# Patient Record
Sex: MALE | Race: WHITE
[De-identification: names, ages, dates, MRNs, and addresses within clinical notes are randomized per-mention and may not be internally consistent; named-entity substitution may affect disease eponyms.]

---

## 2019-11-05 ENCOUNTER — HOSPITAL ENCOUNTER (EMERGENCY)
Dept: HOSPITAL 62 - ER | Age: 39
Discharge: HOME | End: 2019-11-05
Payer: SELF-PAY

## 2019-11-05 VITALS — DIASTOLIC BLOOD PRESSURE: 61 MMHG | SYSTOLIC BLOOD PRESSURE: 100 MMHG

## 2019-11-05 DIAGNOSIS — M25.551: ICD-10-CM

## 2019-11-05 DIAGNOSIS — M25.511: Primary | ICD-10-CM

## 2019-11-05 DIAGNOSIS — M54.6: ICD-10-CM

## 2019-11-05 DIAGNOSIS — M54.9: ICD-10-CM

## 2019-11-05 DIAGNOSIS — F17.200: ICD-10-CM
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
## 2019-11-05 LAB
ADD MANUAL DIFF: NO
ALBUMIN SERPL-MCNC: 4.7 G/DL (ref 3.5–5)
ALP SERPL-CCNC: 77 U/L (ref 38–126)
ANION GAP SERPL CALC-SCNC: 11 MMOL/L (ref 5–19)
APPEARANCE UR: CLEAR
APTT PPP: YELLOW S
AST SERPL-CCNC: 31 U/L (ref 17–59)
BASOPHILS # BLD AUTO: 0.1 10^3/UL (ref 0–0.2)
BASOPHILS NFR BLD AUTO: 1.5 % (ref 0–2)
BILIRUB DIRECT SERPL-MCNC: 0.2 MG/DL (ref 0–0.4)
BILIRUB SERPL-MCNC: 0.4 MG/DL (ref 0.2–1.3)
BILIRUB UR QL STRIP: NEGATIVE
BUN SERPL-MCNC: 16 MG/DL (ref 7–20)
CALCIUM: 9.9 MG/DL (ref 8.4–10.2)
CHLORIDE SERPL-SCNC: 103 MMOL/L (ref 98–107)
CO2 SERPL-SCNC: 27 MMOL/L (ref 22–30)
EOSINOPHIL # BLD AUTO: 0.3 10^3/UL (ref 0–0.6)
EOSINOPHIL NFR BLD AUTO: 3 % (ref 0–6)
ERYTHROCYTE [DISTWIDTH] IN BLOOD BY AUTOMATED COUNT: 13.1 % (ref 11.5–14)
GLUCOSE SERPL-MCNC: 87 MG/DL (ref 75–110)
GLUCOSE UR STRIP-MCNC: NEGATIVE MG/DL
HCT VFR BLD CALC: 41.8 % (ref 37.9–51)
HGB BLD-MCNC: 15.1 G/DL (ref 13.5–17)
KETONES UR STRIP-MCNC: NEGATIVE MG/DL
LYMPHOCYTES # BLD AUTO: 3.4 10^3/UL (ref 0.5–4.7)
LYMPHOCYTES NFR BLD AUTO: 37.2 % (ref 13–45)
MCH RBC QN AUTO: 31.8 PG (ref 27–33.4)
MCHC RBC AUTO-ENTMCNC: 36.1 G/DL (ref 32–36)
MCV RBC AUTO: 88 FL (ref 80–97)
MONOCYTES # BLD AUTO: 0.9 10^3/UL (ref 0.1–1.4)
MONOCYTES NFR BLD AUTO: 9.9 % (ref 3–13)
NEUTROPHILS # BLD AUTO: 4.4 10^3/UL (ref 1.7–8.2)
NEUTS SEG NFR BLD AUTO: 48.4 % (ref 42–78)
PH UR STRIP: 6 [PH] (ref 5–9)
PLATELET # BLD: 255 10^3/UL (ref 150–450)
POTASSIUM SERPL-SCNC: 4.3 MMOL/L (ref 3.6–5)
PROT SERPL-MCNC: 7.9 G/DL (ref 6.3–8.2)
PROT UR STRIP-MCNC: NEGATIVE MG/DL
RBC # BLD AUTO: 4.74 10^6/UL (ref 4.35–5.55)
SP GR UR STRIP: 1.02
TOTAL CELLS COUNTED % (AUTO): 100 %
UROBILINOGEN UR-MCNC: NEGATIVE MG/DL (ref ?–2)
WBC # BLD AUTO: 9.2 10^3/UL (ref 4–10.5)

## 2019-11-05 PROCEDURE — 73502 X-RAY EXAM HIP UNI 2-3 VIEWS: CPT

## 2019-11-05 PROCEDURE — 80053 COMPREHEN METABOLIC PANEL: CPT

## 2019-11-05 PROCEDURE — 73030 X-RAY EXAM OF SHOULDER: CPT

## 2019-11-05 PROCEDURE — 81001 URINALYSIS AUTO W/SCOPE: CPT

## 2019-11-05 PROCEDURE — 36415 COLL VENOUS BLD VENIPUNCTURE: CPT

## 2019-11-05 PROCEDURE — 85025 COMPLETE CBC W/AUTO DIFF WBC: CPT

## 2019-11-05 PROCEDURE — 76770 US EXAM ABDO BACK WALL COMP: CPT

## 2019-11-05 PROCEDURE — 99284 EMERGENCY DEPT VISIT MOD MDM: CPT

## 2019-11-05 PROCEDURE — 96374 THER/PROPH/DIAG INJ IV PUSH: CPT

## 2019-11-05 NOTE — ER DOCUMENT REPORT
ED Fall





- General


Chief Complaint: Fall


Stated Complaint: BACK PAIN


Time Seen by Provider: 11/05/19 18:18


Mode of Arrival: Ambulatory


Information source: Patient


Notes: 


39-year-old male with no management presents emergency right shoulder, right 

hip, and back pain after he fell about 2 feet into a open manhole last night.  

He did not seek treatment last night but the pain got worse to the course of the

day so he decided to seek treatment.  Patient does have full range of motion of 

his right shoulder is ambulating without difficulty.  Denies striking his head 

or loss of consciousness.  Denies acute shortness of breath or chest pain.  No 

other complaints





TRAVEL OUTSIDE OF THE [place] IN LAST 30 DAYS: No





- Related data


Allergies/Adverse Reactions: 


                                        





hydromorphone [From Dilaudid] Allergy (Verified 11/05/19 18:18)


   


Penicillins Allergy (Verified 11/05/19 18:18)


   











Past Medical History





- General


Information source: Patient





- Social History


Smoking Status: Current Every Day Smoker


Chew tobacco use (# tins/day): No


Frequency of alcohol use: None


Drug Abuse: None


Family History: None


Patient has suicidal ideation: No


Patient has homicidal ideation: No


Past Surgical History: Reports: Hx Abdominal Surgery - hernia





Review of Systems





- Review of Systems


Constitutional: See HPI


EENT: No symptoms reported


Cardiovascular: See HPI


Respiratory: See HPI


Gastrointestinal: No symptoms reported


Genitourinary: No symptoms reported


Male Genitourinary: No symptoms reported


Musculoskeletal: See HPI


Skin: No symptoms reported


Hematologic/Lymphatic: No symptoms reported


Neurological/Psychological: No symptoms reported





Physical Exam





- Vital signs


Vitals: 


                                        











Temp Pulse Resp BP Pulse Ox


 


 97.6 F   77   18   104/89 H  100 


 


 11/05/19 18:11  11/05/19 18:11  11/05/19 18:11  11/05/19 18:11  11/05/19 18:11














- Notes


Notes: 





PHYSICAL EXAMINATION:





Reviewed vital signs and charting by RN





GENERAL: Alert, interacts well. No acute distress.


HEAD: Normocephalic, atraumatic.


EYES: Pupils equal and round. Extraocular movements intact.


ENT: Oral mucosa moist, tongue midline. 


NECK: Full range of motion. Trachea midline.


LUNGS: Clear to auscultation bilaterally, no wheezes, rales, or rhonchi. No 

respiratory distress.


HEART: Regular rate and rhythm. No murmur


ABDOMEN: soft, non-tender.  No distention. Bowel sounds present


EXTREMITIES: Moves all 4 extremities spontaneously. No edema,  No cyanosis.  

Tenderness to palpation over his anterior shoulder and over right middle back


PSYCH: Normal affect, normal mood.


SKIN: Warm, dry, normal turgor. No rashes or lesions noted.








Course





- Re-evaluation


Re-evalutation: 





11/05/19 21:42


Well-appearing in no acute distress.


11/05/19 22:41


Ultrasound was read and faxed over the renal ultrasound was unremarkable per ra

diologist report.  Still awaiting x-ray reads.  I reviewed the x-rays and there 

was no evidence of a clavicle fracture, any rib fractures, or any pneumothorax 

with the limited lung field view.  No shoulder dislocation seen.  X-ray of the 

right hip did not show a femur fracture or hip fracture.  At this time because 

patient is ambulating and has full range of motion I comfortable discharging him

home.  Patient is stable for discharge.





- Vital Signs


Vital signs: 


                                        











Temp Pulse Resp BP Pulse Ox


 


 97.6 F   77   18   104/89 H  100 


 


 11/05/19 18:11  11/05/19 18:11  11/05/19 18:11  11/05/19 18:11  11/05/19 18:11














- Laboratory


Result Diagrams: 


                                 11/05/19 18:28





                                 11/05/19 18:28


Laboratory results interpreted by me: 


                                        











  11/05/19





  18:28


 


[facility]  36.1 H














Discharge





- Discharge


Clinical Impression: 


Fall


Qualifiers:


 Encounter type: initial encounter Qualified Code(s): W19.XXXA - Unspecified 

fall, initial encounter





Shoulder pain, right


Qualifiers:


 Chronicity: acute Qualified Code(s): M25.511 - Pain in right shoulder





Acute thoracic back pain


Qualifiers:


 Back pain laterality: right Qualified Code(s): M54.6 - Pain in thoracic spine





Condition: Good


Disposition: HOME, SELF-CARE


Additional Instructions: 


You were seen in the emergency department this evening for shoulder and back 

pain.  Your x-rays were all very reassuring and the ultrasound did not show any 

acute kidney injury.  You can expect to be sore for the next several days.  You 

can take ibuprofen 600 mg every 6 hours with food and/or milk, and you can take 

Tylenol 1000 mg every 6 hours for pain.  You can apply ice to the sore areas.  

You can also apply some over-the-counter Aspercreme to help soothe your aching 

muscles.  Please return to the emergency department if you develop acute 

weakness of your shoulder, paralysis of your arms or legs, or you have any other

concerning symptoms.

## 2019-11-05 NOTE — ER DOCUMENT REPORT
ED Medical Screen (RME)





- General


Chief Complaint: Fall


Stated Complaint: BACK PAIN


Time Seen by Provider: 11/05/19 18:18


Mode of Arrival: Ambulatory


Information source: Patient


Notes: 





39-year-old male presents to ED for complaint of right shoulder hip and back 

pain after he fell into a hole last night up to his shoulder.  He did not come 

to the emergency room last night but the pain is gotten worse so he is in here 

now for his discomfort to find if he has something wrong.  States he smokes a 

third a pack a day no vape no alcohol no drugs.  He states he is not on any 

chronic pain management he states he has no history of back pain.














I have greeted and performed a rapid initial assessment of this patient.  A 

comprehensive ED assessment and evaluation of the patient, analysis of test 

results and completion of medical decision making process will be conducted by 

an additional ED providers.





- Related Data


Allergies/Adverse Reactions: 


                                        





hydromorphone [From Dilaudid] Allergy (Verified 11/05/19 18:18)


   


Penicillins Allergy (Verified 11/05/19 18:18)


   











Physical Exam





- Vital signs


Vitals: 





                                        











Temp Pulse Resp BP Pulse Ox


 


 97.6 F   77   18   104/89 H  100 


 


 11/05/19 18:11  11/05/19 18:11  11/05/19 18:11  11/05/19 18:11  11/05/19 18:11














Course





- Vital Signs


Vital signs: 





                                        











Temp Pulse Resp BP Pulse Ox


 


 97.6 F   77   18   104/89 H  100 


 


 11/05/19 18:11  11/05/19 18:11  11/05/19 18:11  11/05/19 18:11  11/05/19 18:11

## 2019-11-06 NOTE — RADIOLOGY REPORT (SQ)
EXAM DESCRIPTION: 



XR HIP 2 OR MORE VIEWS



COMPLETED DATE/TME:



CLINICAL HISTORY: 



39 years, Male, FELL IN A HOLE, RIGHT HIP PAIN



COMPARISON:

None.



NUMBER OF VIEWS:

Two



TECHNIQUE:

Two views of the right hip



LIMITATIONS:

None.



FINDINGS:



There is no acute fracture or dislocation.  The hip and

sacroiliac joints are normal.  No large soft tissue swelling.



IMPRESSION:



No acute fracture or dislocation.

 



copyright 2011 [employer]- All Rights Reserved

## 2019-11-06 NOTE — RADIOLOGY REPORT (SQ)
EXAM DESCRIPTION: 



XR SHOULDER 2 OR MORE VIEWS



COMPLETED DATE/TME:



CLINICAL HISTORY: 



39 years, Male, RIGHT SHOULDER PAIN, FELL IN A HOLE



COMPARISON:

None.



NUMBER OF VIEWS:

3



TECHNIQUE:

3 views of the right shoulder



LIMITATIONS:

None.



FINDINGS:



No acute fracture or dislocation.  The glenohumeral and AC joints

are intact.  No large soft tissue swelling.



IMPRESSION:



No acute fracture or dislocation.

 



copyright 2011 [employer]- All Rights Reserved

## 2019-11-06 NOTE — RADIOLOGY REPORT (SQ)
US RETROPERITONEUM



EXAM DATE: 11/5/2019 7:49 PM CST



HISTORY: Right flank pain.



COMPARISON: None.



TECHNIQUE: Grayscale and color Doppler ultrasound images of the

kidneys were obtained.



FINDINGS: 



The right kidney measures 10.9 cm in length, which is normal

size. The cortex has normal thickness and echogenicity. There is

no right-sided kidney stone, mass or hydronephrosis.



The left kidney measures 10.9 cm in length, which is normal size.

The cortex has normal thickness and echogenicity. There is no

left-sided kidney stone, mass or hydronephrosis.



The urinary bladder is unremarkable.



IMPRESSION:  



Unremarkable renal ultrasound.